# Patient Record
(demographics unavailable — no encounter records)

---

## 2024-11-15 NOTE — PHYSICAL EXAM
[Normal Appearance] : normal appearance [General Appearance - In No Acute Distress] : no acute distress [Normal Conjunctiva] : the conjunctiva exhibited no abnormalities [Normal Oral Mucosa] : normal oral mucosa [Normal Jugular Venous V Waves Present] : normal jugular venous V waves present [Respiration, Rhythm And Depth] : normal respiratory rhythm and effort [Exaggerated Use Of Accessory Muscles For Inspiration] : no accessory muscle use [Auscultation Breath Sounds / Voice Sounds] : lungs were clear to auscultation bilaterally [Bowel Sounds] : normal bowel sounds [Abdomen Soft] : soft [Abdomen Tenderness] : non-tender [Abnormal Walk] : normal gait [Nail Clubbing] : no clubbing of the fingernails [Cyanosis, Localized] : no localized cyanosis [Petechial Hemorrhages (___cm)] : no petechial hemorrhages [Skin Color & Pigmentation] : normal skin color and pigmentation [] : no rash [No Venous Stasis] : no venous stasis [Skin Lesions] : no skin lesions [Oriented To Time, Place, And Person] : oriented to person, place, and time [Affect] : the affect was normal [Mood] : the mood was normal [No Anxiety] : not feeling anxious [Normal] : normal [No Precordial Heave] : no precordial heave was noted [Normal Rate] : normal [Rhythm Regular] : regular [Normal S1] : normal S1 [Normal S2] : normal S2 [No Gallop] : no gallop heard [I] : a grade 1 [2+] : left 2+ [1+] : left 1+ [No Abnormalities] : the abdominal aorta was not enlarged and no bruit was heard [No Pitting Edema] : no pitting edema present [FreeTextEntry1] : No JVD [Right Carotid Bruit] : no bruit heard over the right carotid [Left Carotid Bruit] : no bruit heard over the left carotid [Bruit] : no bruit heard

## 2024-11-15 NOTE — CARDIOLOGY SUMMARY
[___] : [unfilled] [LVEF ___%] : LVEF [unfilled]% [Enlarged] : enlarged LA size [Mild] : mild mitral regurgitation [de-identified] : SB.  Low voltage. PRWP.  Old IWMi

## 2024-11-15 NOTE — DISCUSSION/SUMMARY
[FreeTextEntry1] : This is an 84 year old man with CAD diagnosed in the setting of an acute inferior wall MI resulting in poor filling in the LAD territory secondary to severe LAD disease, s/p PCI to the proximal and distal RCA with BETO, PCI to the mid and distal LAD with BETO, and subsequent staging of the first obtuse marginal with BETO, a mild ischemic cardiomyopathy,  diabetes, CKD stage 3, and hyperlipidemia who presents to the office for follow-up.   He was just admitted to  with near syncope and a hemoglobin of 5.  He had a negative EGD and, his colonoscopy showed a polyp.  Biopsies were taken.  He was transfused, and an echocardiogram showed normal LV and RV function with no significant valvular disease.  His hgb is table on QOD aspirin, which he will continue.  BP is controlled and creatinine stable on losartan 25 QD, which he will continue. He has follow up scheduled with hematology and nephrology.   He continues unlimited in his ET, and is NHYA class 1.    He will continue on hydralazine 100 bid.   He will continue atorvastatin.  His LDL is controlled on his current dose.      We discussed the benefits of a healthy diet, regular exercise and physical activity, and weight loss in detail.  He will follow with me in 6 months.  His repeat echocardiogram showed normal LV function with no significant valvular heart disease.  [EKG obtained to assist in diagnosis and management of assessed problem(s)] : EKG obtained to assist in diagnosis and management of assessed problem(s)

## 2024-11-15 NOTE — HISTORY OF PRESENT ILLNESS
[FreeTextEntry1] : This is an 84 year old man with CAD diagnosed in the setting of an acute inferior wall MI resulting in poor filling in the LAD territory secondary to severe LAD disease, s/p PCI to the proximal and distal RCA with BETO, PCI to the mid and distal LAD with BETO, and subsequent staging of the first obtuse marginal with BETO, a mild ischemic cardiomyopathy,  diabetes, CKD stage 3, and hyperlipidemia who presents to the office for follow-up.   He was just admitted to  with near syncope and a hemoglobin of 5.  He had a negative EGD and, his colonoscopy showed a polyp.  Biopsies were taken.  He was transfused, and an echocardiogram showed normal LV and RV function with no significant valvular disease.  I resumed QOD aspirin and losartan.   Creatinine is stable, and BP is controlled at outside office visits.  He is now on sodium bicarbonate as his K was high.  His Hgb is stable as well.    His LDL is at goal.    He is working, and not reporting any cardiac symptoms.  He denies any exertional symptoms.  He denies PND, orthopnea, palpitations, dizziness, lightheadedness, or syncope.     He is not reporting any abnormal bleeding.

## 2025-03-31 NOTE — HISTORY OF PRESENT ILLNESS
[FreeTextEntry1] : Today I had the pleasure of seeing Sola Mata who is a 78 years old man with a history of HTN, CAD, and CKD who used to follow up with Dr. Lin.  He is here today to establish care with me since Dr. Lin retired.  He had bloodwork performed in february showing stable creatinine.  He has some mild BPH symptoms but overall feels well he has no cp no sb no nvd.    11/12/19 -- I saw and evaluated Sola today.  He feels well overall and denies complaints.  Has some nocturia but otherwise feels ok.  8/10/20 -- I saw and evaluated Sola today he feels well and denies complaints.  He is still working.  Follows a low salt diet  12/2/21 -- Sola has been doing well.  Has no complaints to speak of.  Recently his blood pressure was slightly elevated and so his doctor asked him to start taking amlodipine.  this caused increase edema and so he stopped it -- edema resolved.  6/7/22 -- Since our last visit Sola has been doing well no chest pain no shortness of breath some minor nocturia but otherwise feeling quite well.  He had a recent gout attack which resolved with prednisone.  11/10/22 -- Since our last visit sola has had titration in his bp meds.  amlodipine was held due to swelling.  his hydralazine was increased.  He is here with his wife who expresses concern over dyspnea.  He reports he is due for stress test.  5/25/23 -- Since our last visit Sola has been doing better.  His blood pressure is well controlled he was evaluated by Dr. Santillan of hematology.  SOB has resolved with improvement in his anemia.  10/27/23 -- Since our last visit has been doing better tired at times.  reports urinary frequency  3/31/25 -- Since our last visit has had a number of issues including post polypectomy bleed kidney stone.  sees hematology regularly.  last creatinine in system 1.9.  reports that he is taking some medication for potassium but not sure what.

## 2025-03-31 NOTE — ASSESSMENT
[FreeTextEntry1] : 84 with CKD here for follow up.  Chronic Kidney Disease Stage IIIa -- The patient has stable CKD last cr was 1.9 om 3/25.  Has not seen me in more than one year.. Etiology might be hypertension, gouty kidney no microhematuria or proteinuria.  His disease is stable overall but has not seen me in sometime.  Blood work done at hematology recently showed cr 1.9.  Asked him to ensure that he sees me about twice per year so that we can monitor kidney funciton.  Hypertension --  The patient does not know his medicaitons.  Does not seem his list is accurate.  Tells me he is on something for potassium.  His BP is not optimized.  Would increase losartan but seems K has been trending high.  I asked him to write down his med list and call me.  Based on his accurate list will decide what to do next.  Hyperkalemia -- monitor potassium. clarify meds.   The time spent is inclusive of the time it took to see, evaluate, and manage the patient.  Time is inclusive of the time taken to review chart, reconcile medications, document findings, and communicate with other providers (when applicable).

## 2025-03-31 NOTE — PHYSICAL EXAM
[General Appearance - Alert] : alert [General Appearance - In No Acute Distress] : in no acute distress [General Appearance - Well Nourished] : well nourished [General Appearance - Well Developed] : well developed [Sclera] : the sclera and conjunctiva were normal [Hearing Threshold Finger Rub Not [place]] : hearing was normal [Neck Appearance] : the appearance of the neck was normal [Neck Cervical Mass (___cm)] : no neck mass was observed [Jugular Venous Distention Increased] : there was no jugular-venous distention [] : no respiratory distress [Respiration, Rhythm And Depth] : normal respiratory rhythm and effort [Exaggerated Use Of Accessory Muscles For Inspiration] : no accessory muscle use [Auscultation Breath Sounds / Voice Sounds] : lungs were clear to auscultation bilaterally [Heart Sounds] : normal S1 and S2 [Heart Sounds Gallop] : no gallops [Murmurs] : no murmurs [Heart Sounds Pericardial Friction Rub] : no pericardial rub [Edema] : there was no peripheral edema [Oriented To Time, Place, And Person] : oriented to person, place, and time [Impaired Insight] : insight and judgment were intact [Affect] : the affect was normal

## 2025-03-31 NOTE — REVIEW OF SYSTEMS
[Fever] : no fever [Chills] : no chills [Feeling Poorly] : not feeling poorly [Feeling Tired] : not feeling tired [Eyesight Problems] : no eyesight problems [Nosebleeds] : no nosebleeds [Chest Pain] : no chest pain [Lower Ext Edema] : no extremity edema [Shortness Of Breath] : no shortness of breath [Wheezing] : no wheezing [Cough] : no cough [SOB on Exertion] : no shortness of breath during exertion [Abdominal Pain] : no abdominal pain [Vomiting] : no vomiting [Nocturia] : no nocturia [Arthralgias] : no arthralgias [Dizziness] : no dizziness [Fainting] : no fainting [Anxiety] : no anxiety [Depression] : no depression [Easy Bleeding] : no tendency for easy bleeding [Easy Bruising] : no tendency for easy bruising

## 2025-05-12 NOTE — HISTORY OF PRESENT ILLNESS
[FreeTextEntry1] : This is an 84 year old man with CAD diagnosed in the setting of an acute inferior wall MI resulting in poor filling in the LAD territory secondary to severe LAD disease, s/p PCI to the proximal and distal RCA with BETO, PCI to the mid and distal LAD with BETO, and subsequent staging of the first obtuse marginal with BETO, a mild ischemic cardiomyopathy,  diabetes, CKD stage 3, and hyperlipidemia who presents to the office for follow-up.       Creatinine is stable, and BP is controlled at outside office visits.   His Hgb is stable as well.   He is following with Dr. Terry.   His LDL is at goal.    He is working, and not reporting any cardiac symptoms.  He denies any exertional symptoms.  He denies PND, orthopnea, palpitations, dizziness, lightheadedness, or syncope.     He is not reporting any abnormal bleeding.

## 2025-05-12 NOTE — PHYSICAL EXAM
[Normal Appearance] : normal appearance [General Appearance - In No Acute Distress] : no acute distress [Normal Conjunctiva] : the conjunctiva exhibited no abnormalities [Normal Oral Mucosa] : normal oral mucosa [Normal Jugular Venous V Waves Present] : normal jugular venous V waves present [FreeTextEntry1] : No JVD [Respiration, Rhythm And Depth] : normal respiratory rhythm and effort [Exaggerated Use Of Accessory Muscles For Inspiration] : no accessory muscle use [Auscultation Breath Sounds / Voice Sounds] : lungs were clear to auscultation bilaterally [Bowel Sounds] : normal bowel sounds [Abdomen Soft] : soft [Abdomen Tenderness] : non-tender [Abnormal Walk] : normal gait [Nail Clubbing] : no clubbing of the fingernails [Cyanosis, Localized] : no localized cyanosis [Petechial Hemorrhages (___cm)] : no petechial hemorrhages [Skin Color & Pigmentation] : normal skin color and pigmentation [] : no rash [No Venous Stasis] : no venous stasis [Skin Lesions] : no skin lesions [Oriented To Time, Place, And Person] : oriented to person, place, and time [Affect] : the affect was normal [Mood] : the mood was normal [No Anxiety] : not feeling anxious [Normal] : normal [No Precordial Heave] : no precordial heave was noted [Normal Rate] : normal [Rhythm Regular] : regular [Normal S1] : normal S1 [Normal S2] : normal S2 [No Gallop] : no gallop heard [I] : a grade 1 [Right Carotid Bruit] : no bruit heard over the right carotid [Left Carotid Bruit] : no bruit heard over the left carotid [2+] : left 2+ [1+] : left 1+ [No Abnormalities] : the abdominal aorta was not enlarged and no bruit was heard [Bruit] : no bruit heard [No Pitting Edema] : no pitting edema present

## 2025-05-12 NOTE — DISCUSSION/SUMMARY
[FreeTextEntry1] : This is an 84 year old man with CAD diagnosed in the setting of an acute inferior wall MI resulting in poor filling in the LAD territory secondary to severe LAD disease, s/p PCI to the proximal and distal RCA with BETO, PCI to the mid and distal LAD with BETO, and subsequent staging of the first obtuse marginal with BETO, a mild ischemic cardiomyopathy,  diabetes, CKD stage 3, and hyperlipidemia who presents to the office for follow-up.    His hgb is table on QOD aspirin, which he will continue.  BP is controlled and creatinine stable on losartan 25 QD, which he will continue. He has follow up scheduled with hematology and nephrology.     He will continue on hydralazine 100 bid.   He will continue atorvastatin.  His LDL is controlled on his current dose.      We discussed the benefits of a healthy diet, regular exercise and physical activity, and weight loss in detail.  He will follow with me in 6 months.  His repeat echocardiogram showed normal LV function with no significant valvular heart disease.   He knows to call the office with any issues.  [EKG obtained to assist in diagnosis and management of assessed problem(s)] : EKG obtained to assist in diagnosis and management of assessed problem(s)

## 2025-05-12 NOTE — CARDIOLOGY SUMMARY
[de-identified] : SB.  Low voltage. PRWP.  Old IWMi [___] : [unfilled] [LVEF ___%] : LVEF [unfilled]% [Enlarged] : enlarged LA size [Mild] : mild mitral regurgitation [___] : [unfilled]

## 2025-05-19 NOTE — REVIEW OF SYSTEMS
[see HPI] : see HPI [Wake up at night to urinate  How many times?  ___] : wakes up to urinate [unfilled] times during the night [Negative] : Heme/Lymph [FreeTextEntry2] : Frequent urination

## 2025-05-19 NOTE — SIGNATURES
[TextEntry] : Rico Hamilton M.D. Minimally Invasive and Robotic Urologic Surgery [facility] | [facility]  of Urology [employer] [facility] Tel: [phone] | Fax: [phone] | email: [email or web address]

## 2025-05-19 NOTE — HISTORY OF PRESENT ILLNESS
[FreeTextEntry1] : Mr. OLEARY is an 84-year-old White M who comes today to clinic referred from ED after presenting flank pain for few days. CT identifying a 2 mm left UVJ stone.  BPH/LUTS currently on tamsulosin 0.4 mg daily, reporting nocturia x 3.  Patient main intake of fluids is prior to bedtime. No history of stones in the past.  5/19/25 Comes for fu.  Follow-up ultrasound confirming passage of stone. Symptoms under control with tamsulosin 0.4 mg daily.   Denies fevers, chills, LUTS, hematuria, AUR.

## 2025-05-19 NOTE — ASSESSMENT
[FreeTextEntry1] : 84-year-old male comes for fu.   BPH/LUTS--Continue tamsulosin.  Discussed behavioral modifications regarding fluids at night to control nocturia.   Follow-up uroflow PVR IPSS.  Kidney stones--We discussed stone prevention and fu imaging.

## 2025-05-19 NOTE — SIGNATURES
[TextEntry] : Rico Hamilton M.D. Minimally Invasive and Robotic Urologic Surgery [facility] | [facility]  of Urology [employer] [facility] Tel: [phone] | Fax: [phone] | email: [email or web address]

## 2025-06-26 NOTE — CARDIOLOGY SUMMARY
[___] : [unfilled] [LVEF ___%] : LVEF [unfilled]% [Enlarged] : enlarged LA size [Mild] : mild mitral regurgitation [___] : [unfilled] [de-identified] : SB.  Low voltage. PRWP.  Old IWMi

## 2025-06-26 NOTE — DISCUSSION/SUMMARY
[FreeTextEntry1] : This is an 84 year old man with CAD diagnosed in the setting of an acute inferior wall MI resulting in poor filling in the LAD territory secondary to severe LAD disease, s/p PCI to the proximal and distal RCA with BETO, PCI to the mid and distal LAD with BETO, and subsequent staging of the first obtuse marginal with BETO, a mild ischemic cardiomyopathy,  diabetes, CKD stage 3, and hyperlipidemia who presents to the office for follow-up.   In June of 2025 he was admitted to  with a suspected TIA.  He had acute weakness of his right arm, and had difficulty speaking.  He had an MRI that showed a chronic lacunar infarct, but no acute CVA.  He was diagnowed with a TIA, and discharged.  Losartan was stopped, hydralazine was increased, atorvastatin was increased, and aspirin was changed to Plavix.  He had an echocardiogram that showed an EF of 50% with severe LVH and severe pulmonary hypertension.  I sent him for a right heart cath, and he does not have pulmonary hypertension.  His pulmonary and filling pressures are normal.   He has not had any further neurological events since discharge.    He will need to follow with neurology.  I am placing a two week Zio to assess for atrial fibrillation, and will then likely need a LINQ ILR.  He is going to have serum and urine immunofixation and pyp study to assess for amyloidosis.    He is going to continue Plavix 75 QD.  His H/H need to be followed.  He will continue his high intensity statin drug.  He will continue Imdur and hydralazine.   He has follow up scheduled with hematology and nephrology.       We discussed the benefits of a healthy diet, regular exercise and physical activity, and weight loss in detail.  He will follow with me in one month.   He knows to call the office with any issues.  [EKG obtained to assist in diagnosis and management of assessed problem(s)] : EKG obtained to assist in diagnosis and management of assessed problem(s)

## 2025-06-26 NOTE — HISTORY OF PRESENT ILLNESS
[FreeTextEntry1] : This is an 84 year old man with CAD diagnosed in the setting of an acute inferior wall MI resulting in poor filling in the LAD territory secondary to severe LAD disease, s/p PCI to the proximal and distal RCA with BETO, PCI to the mid and distal LAD with BETO, and subsequent staging of the first obtuse marginal with BETO, a mild ischemic cardiomyopathy,  diabetes, CKD stage 3, and hyperlipidemia who presents to the office for follow-up.   In June of 2025 he was admitted to  with a suspected TIA.  He had acute weakness of his right arm, and had difficulty speaking.  He had an MRI that showed a chronic lacunar infarct, but no acute CVA.  He was diagnowed with a TIA, and discharged.  Losartan was stopped, hydralazine was increased, atorvastatin was increased, and aspirin was changed to Plavix.  He had an echocardiogram that showed an EF of 50% with severe LVH and severe pulmonary hypertension.  I sent him for a right heart cath, and he had normal pulmonary and filling pressures.  He has not had any further neurological events since discharge.  Creatinine is stable, and BP is controlled at outside office visits.   His Hgb is stable as well.   He is following with Dr. Terry.   He denies any exertional symptoms.  He denies PND, orthopnea, palpitations, dizziness, lightheadedness, or syncope.     He is not reporting any abnormal bleeding.